# Patient Record
Sex: MALE | Race: OTHER | URBAN - METROPOLITAN AREA
[De-identification: names, ages, dates, MRNs, and addresses within clinical notes are randomized per-mention and may not be internally consistent; named-entity substitution may affect disease eponyms.]

---

## 2017-10-10 ENCOUNTER — IMPORTED ENCOUNTER (OUTPATIENT)
Dept: URBAN - METROPOLITAN AREA CLINIC 50 | Facility: CLINIC | Age: 62
End: 2017-10-10

## 2017-10-12 ENCOUNTER — IMPORTED ENCOUNTER (OUTPATIENT)
Dept: URBAN - METROPOLITAN AREA CLINIC 50 | Facility: CLINIC | Age: 62
End: 2017-10-12

## 2018-05-15 ENCOUNTER — IMPORTED ENCOUNTER (OUTPATIENT)
Dept: URBAN - METROPOLITAN AREA CLINIC 50 | Facility: CLINIC | Age: 63
End: 2018-05-15

## 2019-03-15 ENCOUNTER — IMPORTED ENCOUNTER (OUTPATIENT)
Dept: URBAN - METROPOLITAN AREA CLINIC 50 | Facility: CLINIC | Age: 64
End: 2019-03-15

## 2020-04-14 ENCOUNTER — IMPORTED ENCOUNTER (OUTPATIENT)
Dept: URBAN - METROPOLITAN AREA CLINIC 50 | Facility: CLINIC | Age: 65
End: 2020-04-14

## 2020-04-16 ENCOUNTER — IMPORTED ENCOUNTER (OUTPATIENT)
Dept: URBAN - METROPOLITAN AREA CLINIC 50 | Facility: CLINIC | Age: 65
End: 2020-04-16

## 2020-05-11 ENCOUNTER — IMPORTED ENCOUNTER (OUTPATIENT)
Dept: URBAN - METROPOLITAN AREA CLINIC 50 | Facility: CLINIC | Age: 65
End: 2020-05-11

## 2020-07-13 ENCOUNTER — IMPORTED ENCOUNTER (OUTPATIENT)
Dept: URBAN - METROPOLITAN AREA CLINIC 50 | Facility: CLINIC | Age: 65
End: 2020-07-13

## 2020-07-13 NOTE — PATIENT DISCUSSION
"""IOP OD is not at goal. SLT OD to decrease IOP and prevent ocular damage and visual field loss.  ""

## 2020-07-20 ENCOUNTER — IMPORTED ENCOUNTER (OUTPATIENT)
Dept: URBAN - METROPOLITAN AREA CLINIC 50 | Facility: CLINIC | Age: 65
End: 2020-07-20

## 2021-04-17 ASSESSMENT — VISUAL ACUITY
OD_CC: CF 8FT
OS_CC: CF@2FT
OD_CC: J12
OD_SC: CF @ 3FT
OD_SC: CF @ 4FT
OS_CC: J12
OS_CC: CF 8FT
OD_CC: CF@2FT
OS_CC: CF@2'
OS_SC: CF @ 4FT
OS_SC: CF @ 3FT
OD_CC: CF@2'

## 2021-04-17 ASSESSMENT — TONOMETRY
OS_IOP_MMHG: 18
OD_IOP_MMHG: 20
OD_IOP_MMHG: 15
OS_IOP_MMHG: 22
OS_IOP_MMHG: 22
OD_IOP_MMHG: 17
OD_IOP_MMHG: 26
OD_IOP_MMHG: 15
OS_IOP_MMHG: 22
OD_IOP_MMHG: 18
OS_IOP_MMHG: 15
OD_IOP_MMHG: 19
OD_IOP_MMHG: 14
OS_IOP_MMHG: 20
OS_IOP_MMHG: 28
OD_IOP_MMHG: 20
OS_IOP_MMHG: 18

## 2021-07-01 ENCOUNTER — PREPPED CHART (OUTPATIENT)
Dept: URBAN - METROPOLITAN AREA CLINIC 48 | Facility: CLINIC | Age: 66
End: 2021-07-01

## 2021-07-19 ENCOUNTER — ANNUAL COMPREHENSIVE EXAM (OUTPATIENT)
Dept: URBAN - METROPOLITAN AREA CLINIC 48 | Facility: CLINIC | Age: 66
End: 2021-07-19

## 2021-07-19 DIAGNOSIS — Z79.4: ICD-10-CM

## 2021-07-19 DIAGNOSIS — E11.9: ICD-10-CM

## 2021-07-19 PROCEDURE — 92014 COMPRE OPH EXAM EST PT 1/>: CPT

## 2021-07-19 ASSESSMENT — VISUAL ACUITY: OS_SC: CF 2FT

## 2021-07-19 ASSESSMENT — TONOMETRY
OS_IOP_MMHG: 14
OD_IOP_MMHG: 15

## 2021-07-19 NOTE — PATIENT DISCUSSION
Recommend referral to ophthalmology but patient declines and is unable to transport to any other office. Patient can only be seen in [facility] office.

## 2021-07-19 NOTE — PATIENT DISCUSSION
Patient is applying for a guide dog. Form completed for patient today. Recommend LV specialist. Patient defers at this time.

## 2022-03-30 ENCOUNTER — FOLLOW UP (OUTPATIENT)
Dept: URBAN - METROPOLITAN AREA CLINIC 49 | Facility: CLINIC | Age: 67
End: 2022-03-30

## 2022-03-30 DIAGNOSIS — H40.1123: ICD-10-CM

## 2022-03-30 DIAGNOSIS — H25.813: ICD-10-CM

## 2022-03-30 DIAGNOSIS — E11.9: ICD-10-CM

## 2022-03-30 PROCEDURE — 76514 ECHO EXAM OF EYE THICKNESS: CPT

## 2022-03-30 PROCEDURE — 92012 INTRM OPH EXAM EST PATIENT: CPT

## 2022-03-30 ASSESSMENT — PACHYMETRY
OD_CT_UM: 577
OS_CT_UM: 562

## 2022-03-30 ASSESSMENT — TONOMETRY
OS_IOP_MMHG: 16
OD_IOP_MMHG: 17
OD_IOP_MMHG: 15
OS_IOP_MMHG: 17

## 2022-03-30 ASSESSMENT — VISUAL ACUITY: OS_SC: HM @ 2FT

## 2022-03-30 NOTE — PATIENT DISCUSSION
Recommend referral to ophthalmology but patient declines and is unable to transport to any other office. Patient can only be seen in [facility] office.

## 2023-05-10 ENCOUNTER — COMPREHENSIVE EXAM (OUTPATIENT)
Dept: URBAN - METROPOLITAN AREA CLINIC 48 | Facility: LOCATION | Age: 68
End: 2023-05-10

## 2023-05-10 DIAGNOSIS — Z79.4: ICD-10-CM

## 2023-05-10 DIAGNOSIS — H35.52: ICD-10-CM

## 2023-05-10 DIAGNOSIS — E11.9: ICD-10-CM

## 2023-05-10 DIAGNOSIS — H25.813: ICD-10-CM

## 2023-05-10 DIAGNOSIS — H54.8: ICD-10-CM

## 2023-05-10 PROCEDURE — 92014 COMPRE OPH EXAM EST PT 1/>: CPT

## 2023-05-10 ASSESSMENT — TONOMETRY
OD_IOP_MMHG: 22
OD_IOP_MMHG: 20
OS_IOP_MMHG: 16
OS_IOP_MMHG: 16

## 2024-09-09 ENCOUNTER — COMPREHENSIVE EXAM (OUTPATIENT)
Dept: URBAN - METROPOLITAN AREA CLINIC 49 | Facility: LOCATION | Age: 69
End: 2024-09-09

## 2024-09-09 DIAGNOSIS — H35.52: ICD-10-CM

## 2024-09-09 DIAGNOSIS — H54.8: ICD-10-CM

## 2024-09-09 DIAGNOSIS — H25.813: ICD-10-CM

## 2024-09-09 DIAGNOSIS — H40.1133: ICD-10-CM

## 2024-09-09 DIAGNOSIS — Z79.4: ICD-10-CM

## 2024-09-09 DIAGNOSIS — E11.9: ICD-10-CM

## 2024-09-09 PROCEDURE — 99214 OFFICE O/P EST MOD 30 MIN: CPT

## 2024-09-09 PROCEDURE — 76514 ECHO EXAM OF EYE THICKNESS: CPT
